# Patient Record
Sex: FEMALE | Race: BLACK OR AFRICAN AMERICAN
[De-identification: names, ages, dates, MRNs, and addresses within clinical notes are randomized per-mention and may not be internally consistent; named-entity substitution may affect disease eponyms.]

---

## 2018-03-28 ENCOUNTER — HOSPITAL ENCOUNTER (OUTPATIENT)
Dept: HOSPITAL 75 - PREOP | Age: 28
End: 2018-03-28
Attending: OBSTETRICS & GYNECOLOGY
Payer: COMMERCIAL

## 2018-03-28 VITALS — HEIGHT: 66 IN | BODY MASS INDEX: 28.12 KG/M2 | WEIGHT: 175 LBS

## 2018-03-28 DIAGNOSIS — O02.1: ICD-10-CM

## 2018-03-28 DIAGNOSIS — Z01.818: Primary | ICD-10-CM

## 2018-03-30 ENCOUNTER — HOSPITAL ENCOUNTER (OUTPATIENT)
Dept: HOSPITAL 75 - SDC | Age: 28
Discharge: HOME | End: 2018-03-30
Attending: OBSTETRICS & GYNECOLOGY
Payer: COMMERCIAL

## 2018-03-30 VITALS — BODY MASS INDEX: 28.12 KG/M2 | HEIGHT: 66 IN | WEIGHT: 175 LBS

## 2018-03-30 VITALS — SYSTOLIC BLOOD PRESSURE: 127 MMHG | DIASTOLIC BLOOD PRESSURE: 87 MMHG

## 2018-03-30 VITALS — DIASTOLIC BLOOD PRESSURE: 80 MMHG | SYSTOLIC BLOOD PRESSURE: 125 MMHG

## 2018-03-30 VITALS — SYSTOLIC BLOOD PRESSURE: 123 MMHG | DIASTOLIC BLOOD PRESSURE: 72 MMHG

## 2018-03-30 VITALS — SYSTOLIC BLOOD PRESSURE: 120 MMHG | DIASTOLIC BLOOD PRESSURE: 77 MMHG

## 2018-03-30 DIAGNOSIS — O02.1: Primary | ICD-10-CM

## 2018-03-30 LAB
BASOPHILS # BLD AUTO: 0 10^3/UL (ref 0–0.1)
BASOPHILS NFR BLD AUTO: 0 % (ref 0–10)
EOSINOPHIL # BLD AUTO: 0.2 10^3/UL (ref 0–0.3)
EOSINOPHIL NFR BLD AUTO: 3 % (ref 0–10)
ERYTHROCYTE [DISTWIDTH] IN BLOOD BY AUTOMATED COUNT: 12.3 % (ref 10–14.5)
HCT VFR BLD CALC: 39 % (ref 35–52)
HGB BLD-MCNC: 14 G/DL (ref 11.5–16)
LYMPHOCYTES # BLD AUTO: 2.2 X 10^3 (ref 1–4)
LYMPHOCYTES NFR BLD AUTO: 31 % (ref 12–44)
MANUAL DIFFERENTIAL PERFORMED BLD QL: NO
MCH RBC QN AUTO: 33 PG (ref 25–34)
MCHC RBC AUTO-ENTMCNC: 36 G/DL (ref 32–36)
MCV RBC AUTO: 93 FL (ref 80–99)
MONOCYTES # BLD AUTO: 0.5 X 10^3 (ref 0–1)
MONOCYTES NFR BLD AUTO: 7 % (ref 0–12)
NEUTROPHILS # BLD AUTO: 4.2 X 10^3 (ref 1.8–7.8)
NEUTROPHILS NFR BLD AUTO: 59 % (ref 42–75)
PLATELET # BLD: 221 10^3/UL (ref 130–400)
PMV BLD AUTO: 9.6 FL (ref 7.4–10.4)
RBC # BLD AUTO: 4.22 10^6/UL (ref 4.35–5.85)
WBC # BLD AUTO: 7.1 10^3/UL (ref 4.3–11)

## 2018-03-30 PROCEDURE — 94664 DEMO&/EVAL PT USE INHALER: CPT

## 2018-03-30 PROCEDURE — 86901 BLOOD TYPING SEROLOGIC RH(D): CPT

## 2018-03-30 PROCEDURE — 86900 BLOOD TYPING SEROLOGIC ABO: CPT

## 2018-03-30 PROCEDURE — 86850 RBC ANTIBODY SCREEN: CPT

## 2018-03-30 PROCEDURE — 85025 COMPLETE CBC W/AUTO DIFF WBC: CPT

## 2018-03-30 PROCEDURE — 36415 COLL VENOUS BLD VENIPUNCTURE: CPT

## 2018-03-30 PROCEDURE — 87081 CULTURE SCREEN ONLY: CPT

## 2018-03-30 NOTE — XMS REPORT
[facility]

 Created on: 2016



Guerline Estrada

External Reference #: [id number]

: 1990

Sex: Female



Demographics







 Address  [address] [place]  [address]

 

 Home Phone  [phone]

 

 Preferred Language  Unknown

 

 Marital Status  Unknown

 

 Christian Affiliation  Unknown

 

 Race  Black

 

 Ethnic Group  Not  or 





Author







 Author  SHANAE LEWIS

 

 Organization  [employer]

 

 Address  Unknown

 

 Phone  Unavailable







Care Team Providers







 Care Team Member Name  Role  Phone

 

 SHANAE LEWIS  CP  Unavailable



                                                                



Allergies

          No Known Allergies                                                   
                                     



Problems

          





 Problem Type  Condition  Code  Onset Dates  Condition Status

 

 Problem  Encounter for counseling regarding contraception  Z30.9     Active

 

 Problem  General medical examination  Z00.00     Active

 

 Problem  Routine gynecological examination  Z01.419     Active

 

 Problem  Unspecified constipation  564.00     Active

 

 Problem  Pain in joint, forearm  719.43     Active

 

 Problem  Overweight  278.02     Active



                                                                               
                                                           



Medications

          No Known Medications                                                 
                             



Results

          No Known Results                                                     
               



Summary Purpose

          [employer] Submission

## 2018-03-30 NOTE — DISCHARGE INST-WOMEN'S SERVICE
Discharge Inst-Women's Serv


Depart Medication/Instructions


New, Converted or Re-Newed RX:  RX on Chart





Consults/Follow Up


Additional Follow Up:  Yes


Orders/Referrals


Dr. Connelly in 3 weeks





Activity


Activity:  Activity as Tolerated


Driving Instructions:  No Driving for 1 Week


NO SMOKING:  NO SMOKING


Nothing Inside Vagina:  No Douching, No [employer] [place], No Tampons





Diet


Discharge Diet:  No Restrictions


Symptoms to Report to :  Bleeding Excessive, Pain Increased, Fever Over 101 

Degrees F, Vaginal Bleeding Increase


For Any Problems or Questions:  Contact Your Physician, Go to Emergency Room





Skin/Wound Care


Bathing Instructions:  Shower (x 2 weeks)











TIAN CONNELLY DO Mar 30, 2018 8:20 am

## 2018-03-30 NOTE — OPERATIVE REPORT
DATE OF SERVICE:  2018



PREOPERATIVE DIAGNOSES:

1.  28-year-old G2, P1 at 16 weeks' gestation.

2.  14-week IUSD missed .



POSTOPERATIVE DIAGNOSES:

1.  28-year-old G2, P1 at 16 weeks' gestation.

2.  14-week IUSD missed .



PROCEDURE:

Dilatation and evacuation.



SURGEON:

Dr. Elpidio Phillip.



ANESTHESIA:

General endotracheal.



ESTIMATED BLOOD LOSS:

600 mL.



URINE OUTPUT:

200 mL.



FLUIDS:

1300 mL Lactated Ringer's solution.



FINDINGS:

An enlarged uterus approximately 14-week size with products of conception

consisting with that of a 14-week gestation.



PATHOLOGY SENT:

Products of conception.



INDICATIONS FOR PROCEDURE:

This 28-year-old female is a patient that I had diagnosed in my office earlier

last week with fetal demise approximately 14 weeks.  The patient was suspected

to be about 16 to 17-week gestation based on her last menstrual period.  There

was no cardiac activity recognized on ultrasound.  I discussed with the patient

a prescription of Cytotec and delivery in the labor and delivery unit.  The risk

of this would be the discomfort of going through the procedure; however,

bleeding is usually significantly less when we undergo this round.  The other

option given to the patient was proceeding with D and E.  The risk of this was

discussed with the patient as well; however, the convenience of being put to

sleep and not being awake for the procedure or the discomfort.  Risk of bleeding

was my primary focus of the patient and significant blood loss associated with

this procedure.  After all the patient's questions were answered with her

 present, she decided to proceed with D and E.  Consent was obtained in

the preoperative area and the patient was taken to the operating room.



OPERATIVE REPORT IN DETAIL:

Once in the operating room, general anesthesia was found to be adequate.  She

was placed in dorsal lithotomy position, prepped and draped in normal sterile

fashion.  Her bladder was emptied using straight catheterization.  A weighted

speculum inserted to the patient's vagina.  A right angle retractor was used to

visualize the cervix, grasped at 12 o' clock position using two long Allis

clamps.  I then gently dilated the cervix using Hegar dilators to maximum

dilatation of 18 mm, at which point I do place the ring forceps and dilate this

slightly even more with traction of the ring forceps.  I then sounded the

uterine cavity depth, which was found to be approximately 14 to 15 cm.  I then

select a 12 suction [place] curette and placed this within the endometrial

cavity and activate the suction device.  There is fluid noted initially and

followed by blood and products of the conception that are removed from the

uterus.  The placental tissue is easily identified.  It is removed and teased

out of the cervix using ring forceps.  The fetal tissue has to be taken out in

pieces using ring forceps.  All of this has sent has products of conception. 

After this is done, there is a significant amount of bleeding.  I have

anesthesia to give 0.2 mg of Methergine and 250 mcg of Hemabate IM and I

continued to clear out the endometrial canal and cavity using the Master

suction curette as well as a large Banjo curette.  After this is done on several

passes, the bleeding does slow down somewhat.  I do place stitches at 3 and 9 o'

clock positions using 3-0 Vicryl suture to help strangulate the uterine vessels

and control the bleeding, which does a very good job.  Once this is done, there

is no active bleeding noted.  A 10 count is done in the OR to wait for bleeding 
out of

the cervix and this does not occur.  Due to the bleeding slowing down, the

procedure is completed at that point.  All other instruments are removed from

the patient's vagina.  The patient tolerated the procedure well and sent to

recovery area in stable condition.  Lap and sponge counts were correct at the

end of the procedure.  Instruments counts are correct as well.





Job ID: [id number]

DocumentID: [id number]

Dictated Date:  2018 10:32:15

Transcription Date: 2018 15:39:51

Dictated By: DO ANNETTE SCOTT

## 2018-03-30 NOTE — XMS REPORT
[facility]

 Created on: 10/21/2015



Guerline Estrada

External Reference #: [id number]

: 1990

Sex: Female



Demographics







 Address  [address] [place]  [address]

 

 Home Phone  [phone]

 

 Preferred Language  Unknown

 

 Marital Status  Unknown

 

 Sikhism Affiliation  Unknown

 

 Race  Black

 

 Ethnic Group  Not  or 





Author







 MARIZA Tong

 

 [facility]  [employer]

 

 Address  Unknown

 

 Phone  Unavailable







Care Team Providers







 Care Team Member Name  Role  Phone

 

 MARIZA MARCANO  CP  Unavailable



                                                                



Allergies

          No Known Allergies                                                   
                                     



Problems

          





 Problem Type  Condition  Code  Onset Dates  Condition Status

 

 Problem  Overweight  278.02     Active

 

 Problem  Unspecified constipation  564.00     Active

 

 Problem  Pain in joint, forearm  719.43     Active

 

 Assessment  Encounter for immunization  Z23     Active



                                                                               
                                       



Medications

          No Known Medications                                                 
                                       



Procedures

          





 Procedure  Coding System  Code  Date

 

 SINGLE IMMUNIZATION ADMIN  CPT-4  37987  Oct 20, 2015

 

 FLUARIX QUAD (3 & UP)-GSK-  CPT-4  60836  Oct 20, 2015



                                                                               
         



Results

          No Known Results                                                     
                                   



Immunizations

          





 Vaccine  Administration Date

 

 FLUARIX QUAD (3 & UP)-GSK-2015  Oct 20, 2015



                                                                    



Summary Purpose

          [employer] Submission

## 2018-03-30 NOTE — ANESTHESIA-GENERAL POST-OP
General


Patient Condition


Mental Status/LOC:  Same as Preop


Cardiovascular:  Satisfactory


Nausea/Vomiting:  Absent


Respiratory:  Satisfactory


Pain:  Controlled


Complications:  Absent





Post Op Complications


Complications


None





Follow Up Care/Instructions


Patient Instructions


None needed.





Anesthesia/Patient Condition


Patient Condition


Patient is doing well, no complaints, stable vital signs, no apparent adverse 

anesthesia problems.   


No complications reported per nursing.











AUDI FARRELL CRNA Mar 30, 2018 09:02

## 2018-03-30 NOTE — PROGRESS NOTE-PRE OPERATIVE
Pre-Operative Progress Note


H&P Reviewed


The H&P was reviewed, patient examined and no changes noted.


Date Seen by Provider:  Mar 30, 2018


Time Seen by Provider:  07:11


Date H&P Reviewed:  Mar 30, 2018


Time H&P Reviewed:  07:11


Pre-Operative Diagnosis:  14 week IUFD











TIAN CONNELLY DO Mar 30, 2018 7:11 am

## 2019-02-07 ENCOUNTER — HOSPITAL ENCOUNTER (OUTPATIENT)
Dept: HOSPITAL 75 - RAD | Age: 29
End: 2019-02-07
Attending: OBSTETRICS & GYNECOLOGY
Payer: COMMERCIAL

## 2019-02-07 DIAGNOSIS — Z3A.21: ICD-10-CM

## 2019-02-07 DIAGNOSIS — Z36.89: Primary | ICD-10-CM

## 2019-02-07 PROCEDURE — 76805 OB US >/= 14 WKS SNGL FETUS: CPT

## 2019-06-05 ENCOUNTER — HOSPITAL ENCOUNTER (INPATIENT)
Dept: HOSPITAL 75 - WSO | Age: 29
LOS: 2 days | Discharge: HOME | End: 2019-06-07
Attending: OBSTETRICS & GYNECOLOGY | Admitting: OBSTETRICS & GYNECOLOGY
Payer: COMMERCIAL

## 2019-06-05 VITALS — DIASTOLIC BLOOD PRESSURE: 62 MMHG | SYSTOLIC BLOOD PRESSURE: 103 MMHG

## 2019-06-05 VITALS — SYSTOLIC BLOOD PRESSURE: 110 MMHG | DIASTOLIC BLOOD PRESSURE: 61 MMHG

## 2019-06-05 VITALS — DIASTOLIC BLOOD PRESSURE: 62 MMHG | SYSTOLIC BLOOD PRESSURE: 107 MMHG

## 2019-06-05 VITALS — SYSTOLIC BLOOD PRESSURE: 114 MMHG | DIASTOLIC BLOOD PRESSURE: 55 MMHG

## 2019-06-05 VITALS — DIASTOLIC BLOOD PRESSURE: 55 MMHG | SYSTOLIC BLOOD PRESSURE: 104 MMHG

## 2019-06-05 VITALS — WEIGHT: 212 LBS | HEIGHT: 65 IN | BODY MASS INDEX: 35.32 KG/M2

## 2019-06-05 VITALS — SYSTOLIC BLOOD PRESSURE: 101 MMHG | DIASTOLIC BLOOD PRESSURE: 60 MMHG

## 2019-06-05 VITALS — DIASTOLIC BLOOD PRESSURE: 78 MMHG | SYSTOLIC BLOOD PRESSURE: 131 MMHG

## 2019-06-05 VITALS — DIASTOLIC BLOOD PRESSURE: 68 MMHG | SYSTOLIC BLOOD PRESSURE: 105 MMHG

## 2019-06-05 VITALS — DIASTOLIC BLOOD PRESSURE: 75 MMHG | SYSTOLIC BLOOD PRESSURE: 127 MMHG

## 2019-06-05 VITALS — DIASTOLIC BLOOD PRESSURE: 41 MMHG | SYSTOLIC BLOOD PRESSURE: 94 MMHG

## 2019-06-05 VITALS — SYSTOLIC BLOOD PRESSURE: 119 MMHG | DIASTOLIC BLOOD PRESSURE: 78 MMHG

## 2019-06-05 DIAGNOSIS — Z3A.37: ICD-10-CM

## 2019-06-05 DIAGNOSIS — O34.211: Primary | ICD-10-CM

## 2019-06-05 LAB
BARBITURATES UR QL: NEGATIVE
BASOPHILS # BLD AUTO: 0 10^3/UL (ref 0–0.1)
BASOPHILS NFR BLD AUTO: 0 % (ref 0–10)
BENZODIAZ UR QL SCN: NEGATIVE
COCAINE UR QL: NEGATIVE
EOSINOPHIL # BLD AUTO: 0 10^3/UL (ref 0–0.3)
EOSINOPHIL NFR BLD AUTO: 0 % (ref 0–10)
ERYTHROCYTE [DISTWIDTH] IN BLOOD BY AUTOMATED COUNT: 12.6 % (ref 10–14.5)
HCT VFR BLD CALC: 39 % (ref 35–52)
HGB BLD-MCNC: 13.8 G/DL (ref 11.5–16)
LYMPHOCYTES # BLD AUTO: 1.8 X 10^3 (ref 1–4)
LYMPHOCYTES NFR BLD AUTO: 20 % (ref 12–44)
MANUAL DIFFERENTIAL PERFORMED BLD QL: NO
MCH RBC QN AUTO: 33 PG (ref 25–34)
MCHC RBC AUTO-ENTMCNC: 36 G/DL (ref 32–36)
MCV RBC AUTO: 92 FL (ref 80–99)
METHADONE UR QL SCN: NEGATIVE
METHAMPHETAMINE SCREEN URINE S: NEGATIVE
MONOCYTES # BLD AUTO: 0.6 X 10^3 (ref 0–1)
MONOCYTES NFR BLD AUTO: 7 % (ref 0–12)
NEUTROPHILS # BLD AUTO: 6.6 X 10^3 (ref 1.8–7.8)
NEUTROPHILS NFR BLD AUTO: 73 % (ref 42–75)
OPIATES UR QL SCN: NEGATIVE
OXYCODONE UR QL: NEGATIVE
PLATELET # BLD: 124 10^3/UL (ref 130–400)
PMV BLD AUTO: 11 FL (ref 7.4–10.4)
PROPOXYPH UR QL: NEGATIVE
TRICYCLICS UR QL SCN: NEGATIVE
WBC # BLD AUTO: 9 10^3/UL (ref 4.3–11)

## 2019-06-05 PROCEDURE — 88307 TISSUE EXAM BY PATHOLOGIST: CPT

## 2019-06-05 PROCEDURE — 80306 DRUG TEST PRSMV INSTRMNT: CPT

## 2019-06-05 PROCEDURE — 86850 RBC ANTIBODY SCREEN: CPT

## 2019-06-05 PROCEDURE — 94664 DEMO&/EVAL PT USE INHALER: CPT

## 2019-06-05 PROCEDURE — 36415 COLL VENOUS BLD VENIPUNCTURE: CPT

## 2019-06-05 PROCEDURE — 86900 BLOOD TYPING SEROLOGIC ABO: CPT

## 2019-06-05 PROCEDURE — 86901 BLOOD TYPING SEROLOGIC RH(D): CPT

## 2019-06-05 PROCEDURE — 99212 OFFICE O/P EST SF 10 MIN: CPT

## 2019-06-05 PROCEDURE — 85025 COMPLETE CBC W/AUTO DIFF WBC: CPT

## 2019-06-05 RX ADMIN — SODIUM CHLORIDE, SODIUM LACTATE, POTASSIUM CHLORIDE, CALCIUM CHLORIDE, AND DEXTROSE MONOHYDRATE SCH MLS/HR: 600; 310; 30; 20; 5 INJECTION, SOLUTION INTRAVENOUS at 23:50

## 2019-06-05 RX ADMIN — DOCUSATE SODIUM SCH MG: 100 CAPSULE ORAL at 21:13

## 2019-06-05 RX ADMIN — SODIUM CHLORIDE, SODIUM LACTATE, POTASSIUM CHLORIDE, AND CALCIUM CHLORIDE PRN MLS/HR: 600; 310; 30; 20 INJECTION, SOLUTION INTRAVENOUS at 08:37

## 2019-06-05 RX ADMIN — Medication SCH MLS/HR: at 13:10

## 2019-06-05 RX ADMIN — SODIUM CHLORIDE, SODIUM LACTATE, POTASSIUM CHLORIDE, CALCIUM CHLORIDE, AND DEXTROSE MONOHYDRATE SCH MLS/HR: 600; 310; 30; 20; 5 INJECTION, SOLUTION INTRAVENOUS at 17:30

## 2019-06-05 RX ADMIN — KETOROLAC TROMETHAMINE SCH MG: 30 INJECTION, SOLUTION INTRAMUSCULAR; INTRAVENOUS at 16:08

## 2019-06-05 RX ADMIN — SODIUM CHLORIDE, SODIUM LACTATE, POTASSIUM CHLORIDE, CALCIUM CHLORIDE, AND DEXTROSE MONOHYDRATE SCH MLS/HR: 600; 310; 30; 20; 5 INJECTION, SOLUTION INTRAVENOUS at 20:25

## 2019-06-05 RX ADMIN — Medication SCH MLS/HR: at 20:25

## 2019-06-05 RX ADMIN — KETOROLAC TROMETHAMINE SCH MG: 30 INJECTION, SOLUTION INTRAMUSCULAR; INTRAVENOUS at 09:00

## 2019-06-05 RX ADMIN — KETOROLAC TROMETHAMINE SCH MG: 30 INJECTION, SOLUTION INTRAMUSCULAR; INTRAVENOUS at 21:13

## 2019-06-05 RX ADMIN — SODIUM CHLORIDE, SODIUM LACTATE, POTASSIUM CHLORIDE, AND CALCIUM CHLORIDE PRN MLS/HR: 600; 310; 30; 20 INJECTION, SOLUTION INTRAVENOUS at 07:44

## 2019-06-05 NOTE — NUR
VOIDED ANOTHER 275 CC URINE. AMBULATING WITH STANDBY ASSISTANCE. VAG FLOW LT/MOD RUBRA. PAD 
CHANGE.

## 2019-06-05 NOTE — HISTORY & PHYSICAL
History and Physical


Date Seen by Provider:  2019


Time Seen by Provider:  07:53


This patient is a 29-year-old  A1 white female with an EDC of 6 [phone] 

putting her now at 37 weeks gestation.  Her history is significant for previous 

.  She presented with complaint of severe pain with contractions.  She 

is writhing in pain moaning and huffing and puffing.  She is curtis every 2

minutes.  Her symptoms have only increased with hydration.  Her cervix was thick

and closed on presentation she is now 2 cm and 80 percent effaced.  She denies 

rupture membranes or bleeding.  She's had no problems with his pregnancy to 

date.  GBS culture was negative.





Allergies are none





Medications are prenatal vitamins





Medical social and surgical histories are per the antepartum record





HEENT exam is normal


Neck is supple no lymphadenopathy no thyromegaly


Abdomen is benign gravid and nontender


Extreme show no clubbing cyanosis.  There is no Homans sign.





Pelvic exam shows a cervix is now 2 similar dilated 50 percent effaced and 0 

station.  Bedside ultrasound shows a transverse lie.








Laboratory Tests








Test


 19


06:05 Range/Units


 


 


White Blood Count


 9.0 


 4.3-11.0


10^3/uL


 


Red Blood Count


 4.20 L


 4.35-5.85


10^6/uL


 


Hemoglobin 13.8  11.5-16.0  G/DL


 


Hematocrit 39  35-52  %


 


Mean Corpuscular Volume 92  80-99  FL


 


Mean Corpuscular Hemoglobin 33  25-34  PG


 


Mean Corpuscular Hemoglobin


Concent 36 


 32-36  G/DL





 


Red Cell Distribution Width 12.6  10.0-14.5  %


 


Platelet Count


 124 L


 130-400


10^3/uL


 


Mean Platelet Volume 11.0 H 7.4-10.4  FL


 


Neutrophils (%) (Auto) 73  42-75  %


 


Lymphocytes (%) (Auto) 20  12-44  %


 


Monocytes (%) (Auto) 7  0-12  %


 


Eosinophils (%) (Auto) 0  0-10  %


 


Basophils (%) (Auto) 0  0-10  %


 


Neutrophils # (Auto) 6.6  1.8-7.8  X 10^3


 


Lymphocytes # (Auto) 1.8  1.0-4.0  X 10^3


 


Monocytes # (Auto) 0.6  0.0-1.0  X 10^3


 


Eosinophils # (Auto)


 0.0 


 0.0-0.3


10^3/uL


 


Basophils # (Auto)


 0.0 


 0.0-0.1


10^3/uL





Assessment and plan   37 weeks gestation with previous  in labor.  

Patient has given been given a dose of terbutaline to suppress contractions in 

preparation for spinal anesthesia for a repeat .  Surgical risk 

complication recovering follow-up have been discussed and patient is ready to 

proceed.


37 weeks previous  in labor





Allergies and Home Medications


Allergies


Coded Allergies:  


     No Known Drug Allergies (Unverified , 3/28/18)





Home Medications


Evd052/Iron Fumarate/FA/Dss 1 Each Tablet, 1 EACH PO DAILY, (Reported)





Patient Home Medication List


Home Medication List Reviewed:  Yes











DILCIA MCMULLEN MD        2019 07:57

## 2019-06-05 NOTE — NUR
NERY SOMMER presented to unit via ambulatory from home, accompanied by daughter, with c/o 
LOWER ABD PAIN. NERY SOMMER weighed, gowned, voided, and to bed.  EFHM and TOCO applied, 
VS taken.  NERY SOMMER oriented to bed controls, call light, TV, heat, and A/C controls.

## 2019-06-05 NOTE — OPERATIVE REPORT
DATE OF SERVICE:  2019



PREOPERATIVE DIAGNOSES:

A 37-week pregnancy in labor with previous .



POSTOPERATIVE DIAGNOSIS:

A 37-week pregnancy in labor with previous .



OPERATIVE PROCEDURE:

Repeat low transverse  delivery of a viable male infant with Apgars of 6

and 9 at 1 and 5 minutes respectively, weight of 8 pounds, birth time _____ and

a cord blood gas of 7.08.



OPERATIVE DESCRIPTION:

With the patient in the supine position under satisfactory spinal analgesia, she

was prepped and draped in the usual fashion for abdominal surgery.  Hoover

catheter was placed in the urinary bladder left to dependent drainage.



A repeat Pfannenstiel incision was made through skin with a scalpel.  The

patient's abdomen entered in the usual manner.  Bladder retractor placed into

position and clean scalpel used to make a 4 cm hysterotomy incision transversely

across the lower uterine segment that was extended by blunt dissection. 

Membranes were ruptured in the process, releasing copious clear fluid.  A

vigorous viable male infant was delivered via the uterine incision.  Infant had

Apgars of 6 and 9 at 1 and 5 minutes respectively.  _____ stats as noted above. 

The infant was bulb suctioned on delivery of the head and again on completion of

delivery.  The umbilical cord was doubly clamped and cut and the infant passed

to the pediatric nurse in attendance for delivery.



Cord blood was obtained.  The placenta delivered spontaneously Hicks.  It was

normal with a 3-vessel cord.  The uterus was exteriorized and interior wiped

clean with a wet laparotomy sponge.  Uterine incision then closed with a running

locked suture of 2-0 Vicryl.  Hemostasis was complete.  The uterus was returned

to the abdominal cavity.  All blood clot and debris removed from the abdominal

cavity.  Sponge and needle counts correct and hemostasis assured.  The anterior

parietal peritoneum was closed with running suture of 2-0 Vicryl.  Rectus

muscles were closed with 2-0 Vicryl, rectus fascia was closed with 2-0 Vicryl,

subcutaneous tissue was closed with 2-0 Vicryl and the skin was stapled.



Sponge and needle counts were correct on completion of the procedure.  Estimated

blood loss was around 200 mL.  The patient tolerated the procedure well and was

transferred to the recovery room in stable condition.  The infant had been taken

stable to the full term nursery.





Job ID: [id number]

DocumentID: [id number]

Dictated Date:  2019 11:34:36

Transcription Date: 2019 15:57:10

Dictated By: DILCIA MCMULLEN MD

## 2019-06-05 NOTE — NUR
UP TO THE BATHROOM TO VOID WITH STANDBY ASSISTANCE. MOVES WELL. ABLE TO BEAR WEIGHT WELL. 
VOIDED 150CC URINE. PERIC ARE WITH PAD/UNDERWEAR CHANGE. BACK TO BED WITHOUT DIFFICULTY.

## 2019-06-05 NOTE — XMS REPORT
Continuity of Care Document

                             Created on: 2019



NERY SOMMER

External Reference #: [id number]

: 1990

Sex: Female



Demographics







                          Address                   [address] [place]  [address]

 

                          Home Phone                [phone] x

 

                          Preferred Language        Unknown

 

                          Marital Status            Unknown

 

                          Druze Affiliation     Unknown

 

                          Race                      Unknown

 

                          Ethnic Group              Unknown





Author







                          Organization              Unknown

 

                          Address                   Unknown

 

                          Phone                     [phone]



              



Allergies

      





             Active              Description              Code              Type              Severity

                Reaction              Onset              Reported/Identified              Relationship

 to Patient                             Clinical Status        

 

                Yes              No Known Drug Allergies              [id number]              Drug Allergy

              Unknown              N/A                             2018              

                                                 



                  



Medications

      



There is no data.                  



Problems

      





             Date Dx Coded              Attending              Type              Code              Diagnosis

                                        Diagnosed By        

 

                2014              SHANAE SOLIS                              719.43        

                          PAIN IN JOINT INVOLVING FOREARM                       

 

                2014              MARIZA MARCANO DO                              719.43           

                          PAIN IN JOINT INVOLVING FOREARM                       

 

                2018              TIAN CONNELLY DO              Ot              O02.1       

                          MISSED                        

 

                2018              TIAN CONNELLY DO              Ot              Z01.818     

                          ENCOUNTER FOR OTHER PREPROCEDURAL EXAMIN                       

 

                2018              TIAN CONNELLY DO              Ot              O02.1       

                          MISSED                        

 

                2018              TIAN CONNELLY DO              Ot              O02.1       

                          MISSED                        

 

                2019              TIAN CONNELLY DO              Ot              Z36.89      

                          ENCOUNTER FOR OTHER SPECIFIED                         

 

                2019              TIAN CONNELLY DO              Ot              Z3A.21      

                          21 WEEKS GESTATION OF PREGNANCY                       



                                



Procedures

      





                Code              Description              Performed By              Performed On     

   

 

                                      36924                                    THERAPUTIC INJ SQ/IM         

                                                    2014        

 

                                      75288                                    TB TEST INTRADERMAL          

                                                    2014        



                    



Results

      





                    Test                Result              Range        









                                        Methicillin resistant Staphylococcus aureus (MRSA) screening culture - 18 

06:30         









                          Methicillin resistant Staphylococcus aureus (MRSA) screening culture              

NEG                                     NRG        









                                        Complete blood count (CBC) with automated white blood cell (WBC) differential - 

18 06:42         









                          Blood leukocytes automated count (number/volume)              7.1 10*3/uL         

                                        4.3-11.0        

 

                          Blood erythrocytes automated count (number/volume)              4.22 10*6/uL      

                                        4.35-5.85        

 

                          Venous blood hemoglobin measurement (mass/volume)              14.0 g/dL          

                                        11.5-16.0        

 

                    Blood hematocrit (volume fraction)              39 %                35-52        

 

                    Automated erythrocyte mean corpuscular volume              93 [foz_us]              

80-99        

 

                                        Automated erythrocyte mean corpuscular hemoglobin (mass per erythrocyte)        

                          33 pg                     25-34        

 

                                        Automated erythrocyte mean corpuscular hemoglobin concentration measurement (mass/volume)

                          36 g/dL                   32-36        

 

                    Automated erythrocyte distribution width ratio              12.3 %              10.0-

14.5        

 

                    Automated blood platelet count (count/volume)              221 10*3/uL              

130-400        

 

                          Automated blood platelet mean volume measurement              9.6 [foz_us]        

                                        7.4-10.4        

 

                    Automated blood neutrophils/100 leukocytes              59 %                42-75     

   

 

                    Automated blood lymphocytes/100 leukocytes              31 %                12-44     

   

 

                    Blood monocytes/100 leukocytes              7 %                 0-12        

 

                    Automated blood eosinophils/100 leukocytes              3 %                 0-10       

 

 

                    Automated blood basophils/100 leukocytes              0 %                 0-10        

 

                          Blood neutrophils automated count (number/volume)              4.2 10*3           

                                        1.8-7.8        

 

                          Blood lymphocytes automated count (number/volume)              2.2 10*3           

                                        1.0-4.0        

 

                    Blood monocytes automated count (number/volume)              0.5 10*3              0.0-

1.0        

 

                    Automated eosinophil count              0.2 10*3/uL              0.0-0.3        

 

                    Automated blood basophil count (count/volume)              0.0 10*3/uL              

0.0-0.1        









                                        Blood type T Indirect antibody screen panel - 18 06:42         









                    ABO+Rh group              AP                  NRG        

 

                    Transfusion band number              [id number]               NRG        

 

                    Blood group antibody screen              NEGATIVE               NRG        









                                        Complete blood count (CBC) with automated white blood cell (WBC) differential - 

19 06:05         









                          Blood leukocytes automated count (number/volume)              9.0 10*3/uL         

                                        4.3-11.0        

 

                          Blood erythrocytes automated count (number/volume)              4.20 10*6/uL      

                                        4.35-5.85        

 

                          Venous blood hemoglobin measurement (mass/volume)              13.8 g/dL          

                                        11.5-16.0        

 

                    Blood hematocrit (volume fraction)              39 %                35-52        

 

                    Automated erythrocyte mean corpuscular volume              92 [foz_us]              

80-99        

 

                                        Automated erythrocyte mean corpuscular hemoglobin (mass per erythrocyte)        

                          33 pg                     25-34        

 

                                        Automated erythrocyte mean corpuscular hemoglobin concentration measurement (mass/volume)

                          36 g/dL                   32-36        

 

                    Automated erythrocyte distribution width ratio              12.6 %              10.0-

14.5        

 

                    Automated blood platelet count (count/volume)              124 10*3/uL              

130-400        

 

                          Automated blood platelet mean volume measurement              11.0 [foz_us]       

                                        7.4-10.4        

 

                    Automated blood neutrophils/100 leukocytes              73 %                42-75     

   

 

                    Automated blood lymphocytes/100 leukocytes              20 %                12-44     

   

 

                    Blood monocytes/100 leukocytes              7 %                 0-12        

 

                    Automated blood eosinophils/100 leukocytes              0 %                 0-10       

 

 

                    Automated blood basophils/100 leukocytes              0 %                 0-10        

 

                          Blood neutrophils automated count (number/volume)              6.6 10*3           

                                        1.8-7.8        

 

                          Blood lymphocytes automated count (number/volume)              1.8 10*3           

                                        1.0-4.0        

 

                    Blood monocytes automated count (number/volume)              0.6 10*3              0.0-

1.0        

 

                    Automated eosinophil count              0.0 10*3/uL              0.0-0.3        

 

                    Automated blood basophil count (count/volume)              0.0 10*3/uL              

0.0-0.1        



                      



Encounters

      





                ACCT No.              Visit Date/Time              Discharge              Status      

                Pt. Type              Provider              Facility              Loc./Unit      

                                        Complaint        

 

                [id number]              2014 14:07:00              2014 23:59:59              

[facility]              Outpatient              MARIVELL SHANAE [facility]                              

                                                 

 

                [id number]              2014 14:07:00              2014 23:59:59              

[facility]              Outpatient              MARIZA MARCANO DO                                 

                                                 

 

                [id number]              2018 14:40:00              2018 23:59:59              [facility]

                Outpatient              MARIZA MARCANO DO                              [facility]                                  

 

                    [id number]              2019 14:55:00              2019 23:59:59      

                [facility]              Outpatient              FENECH TIAN BAEZ              [facility]              RAD                       PREGNANT        

 

                    [id number]              2018 05:57:00              2018 11:10:00      

                DIS              Outpatient              FENTIAN OLIVERA DO S              [facility]              SDC                       INTRAUTERINE FETAL DEMISE AT 14 WEEKS

        

 

                    [id number]              2018 05:39:00              2018 10:52:00      

                DIS              Outpatient              TIAN CONNELLY DO              [facility]              PREOP                     INTRAUTERINE FETAL DEMISE      

  

 

                [id number]              2019 07:30:00                              PEN         

             Preadmit              GODWIN BAEZTIAN S                                            

PREVIOUS  SECTION        

 

                [id number]              2019 06:18:00                                          

             Document Registration

## 2019-06-05 NOTE — DISCHARGE INSTRUCTIONS
Discharge Instructions


Discharge Medications


New, Converted or Re-Newed RX:  RX on Chart





Patient Instructions


Patient Instructions:  


As directed


[facility]  


AS DIRECTED





Activity & Diet


Discharge Diet:  No Restrictions


Activity as Tolerated:  No





Orders-Post D/C & Referrals





Follow Up Appt:


RTC 1 week for incision check with Dr. Apodaca.


Call to make follow up appt. for patient in 6 weeks with Dr. Christie for 

postpartum exam.





Wound Care:


Remove staples, apply benzoin and steri strips.





Activity 


Per routine post  instructions.





Please call in RX to patient pharmacy.





Diet as tolerated





Patient may shower or tub bathe as desired.





Continue home meds











DILCIA APODACA MD        2019 08:07

## 2019-06-05 NOTE — NUR
Surgery called for surgery crew.

-------------------------------------------------------------------------------

Addendum: 19 at 0742 by WERO LOPEZ RN

-------------------------------------------------------------------------------

Surgery notified for .

## 2019-06-05 NOTE — NUR
TRANSFERRED TO  ROOM 307 VIA BED FROM [facility]  ACC BY LAINEY MONTANO AND BRETT MARTINEZ PCT AFTER A 
REPEAT  SECTION DELIVERY OF A VIABLE MALE INFANT BY DR. MCMULLEN. A/O X4. HRRR. 
BREATH SOUNDS CTA BILATERALLY. VSS. ABD SOFT. ABD DRSG D/I OVER LOW TRANSVERSE ABD INCISION. 
FF U/1. VAG FLOW LT/MOD RUBRA. DENIES PAIN. CALF SCDS ON BILATERALLY. BLADDER FILLING. IV OF 
500 CC NS WITH 30 UNITS PITOCIN INFUSING /H/PUMP. SITE CLEAR. LR INFUSING AT KVO RATE 
PER GRAVITY FLOW. ORIENTED TO SURROUNDINGS, CALL LIGHT OPERATION, AND ROOM SERVICE 
PROCEDURE. S.O. AT BEDSIDE.

## 2019-06-06 VITALS — SYSTOLIC BLOOD PRESSURE: 129 MMHG | DIASTOLIC BLOOD PRESSURE: 72 MMHG

## 2019-06-06 VITALS — DIASTOLIC BLOOD PRESSURE: 74 MMHG | SYSTOLIC BLOOD PRESSURE: 121 MMHG

## 2019-06-06 VITALS — SYSTOLIC BLOOD PRESSURE: 125 MMHG | DIASTOLIC BLOOD PRESSURE: 76 MMHG

## 2019-06-06 VITALS — DIASTOLIC BLOOD PRESSURE: 66 MMHG | SYSTOLIC BLOOD PRESSURE: 131 MMHG

## 2019-06-06 RX ADMIN — IBUPROFEN SCH MG: 800 TABLET, FILM COATED ORAL at 16:17

## 2019-06-06 RX ADMIN — IBUPROFEN SCH MG: 800 TABLET, FILM COATED ORAL at 22:10

## 2019-06-06 RX ADMIN — DOCUSATE SODIUM SCH MG: 100 CAPSULE ORAL at 22:10

## 2019-06-06 RX ADMIN — IBUPROFEN SCH MG: 800 TABLET, FILM COATED ORAL at 10:11

## 2019-06-06 RX ADMIN — DOCUSATE SODIUM SCH MG: 100 CAPSULE ORAL at 10:11

## 2019-06-06 RX ADMIN — KETOROLAC TROMETHAMINE SCH MG: 30 INJECTION, SOLUTION INTRAMUSCULAR; INTRAVENOUS at 00:22

## 2019-06-06 RX ADMIN — KETOROLAC TROMETHAMINE SCH MG: 30 INJECTION, SOLUTION INTRAMUSCULAR; INTRAVENOUS at 04:24

## 2019-06-06 NOTE — PROGRESS NOTE-STANDARD
Standard Progress Note


Progress Notes/Assess & Plan


Date Seen by a Provider:  2019


Time Seen by a Provider:  08:02


Progress/Assessment & Plan


This patient is without complaint.  She is ambulating, voiding, tolerating oral 

intake well has good pain control.








Vital Signs








 19





 04:25


 


Temp 98.6


 


Pulse 91


 


Resp 18


 


B/P (MAP) 121/74 (90)


 


Pulse Ox 97


 


O2 Delivery Room Air





Vital signs are stable.  Patient is afebrile.





The abdomen is benign.  Fundus is firm below the umbilicus is nontender.  

Incision is clean dry and intact.


Extreme show no clubbing cyanosis.  There is no Homans sign.





Assessment and plan   post operative day number 1 status post repeat  

delivery at 37 weeks gestation.  Plan is for routine convalescence care











DILCIA MCMULLEN MD        2019 08:03

## 2019-06-06 NOTE — NUR
Patient up in room on couch. Infant asleep on back in open crib, reswaddled per this RN at 
patient request. Fresh ice water provided. POC reviewed with patient. Patient denies any 
needs, concerns or pain at this time. Will return for assessment.

## 2019-06-07 VITALS — SYSTOLIC BLOOD PRESSURE: 126 MMHG | DIASTOLIC BLOOD PRESSURE: 78 MMHG

## 2019-06-07 VITALS — DIASTOLIC BLOOD PRESSURE: 80 MMHG | SYSTOLIC BLOOD PRESSURE: 125 MMHG

## 2019-06-07 RX ADMIN — DOCUSATE SODIUM SCH MG: 100 CAPSULE ORAL at 09:17

## 2019-06-07 RX ADMIN — IBUPROFEN SCH MG: 800 TABLET, FILM COATED ORAL at 09:17

## 2019-06-07 RX ADMIN — IBUPROFEN SCH MG: 800 TABLET, FILM COATED ORAL at 03:41

## 2019-06-07 NOTE — PROGRESS NOTE-STANDARD
Standard Progress Note


Progress Notes/Assess & Plan


Date Seen by a Provider:  2019


Time Seen by a Provider:  07:47


Progress/Assessment & Plan


This patient is without complaint.  She is ambulating, voiding, tolerating oral 

intake well has good pain control.








Vital Signs








 19





 04:25


 


Temp 98.6


 


Pulse 91


 


Resp 18


 


B/P (MAP) 121/74 (90)


 


Pulse Ox 97


 


O2 Delivery Room Air





Vital signs are stable.  Patient is afebrile.





The abdomen is benign.  Fundus is firm below the umbilicus is nontender.  

Incision is clean dry and intact.


Extreme show no clubbing cyanosis.  There is no Homans sign.





Assessment and plan   post operative day number 1 status post repeat  

delivery at 37 weeks gestation.  Plan is for routine convalescence care





2019


Patient without complaint.  She is ambulating, voiding, tolerating oral intake 

well has good pain control.  Patient is requesting discharge home.








Vital Signs








 19





 03:42


 


Temp 98.5


 


Pulse 84


 


Resp 18


 


B/P (MAP) 126/78 (94)


 


Pulse Ox 98


 


O2 Delivery Room Air





Vital signs are stable.  Patient afebrile.








Assessment and plan   postoperative day number 2 status post repeat  at

37 weeks gestation.  Plan is for discharge home with follow-up in clinic


Final Diagnosis


Repeat  at 37 weeks gestation











DILCIA MCMULLEN MD        2019 07:48